# Patient Record
Sex: MALE | Race: BLACK OR AFRICAN AMERICAN | NOT HISPANIC OR LATINO | URBAN - METROPOLITAN AREA
[De-identification: names, ages, dates, MRNs, and addresses within clinical notes are randomized per-mention and may not be internally consistent; named-entity substitution may affect disease eponyms.]

---

## 2017-03-17 ENCOUNTER — OUTPATIENT (OUTPATIENT)
Dept: OUTPATIENT SERVICES | Facility: HOSPITAL | Age: 47
LOS: 1 days | End: 2017-03-17

## 2017-03-17 ENCOUNTER — APPOINTMENT (OUTPATIENT)
Dept: OTOLARYNGOLOGY | Facility: CLINIC | Age: 47
End: 2017-03-17

## 2017-03-17 DIAGNOSIS — R22.0 LOCALIZED SWELLING, MASS AND LUMP, HEAD: ICD-10-CM

## 2017-03-17 DIAGNOSIS — R22.1 LOCALIZED SWELLING, MASS AND LUMP, HEAD: ICD-10-CM

## 2017-03-17 PROBLEM — Z00.00 ENCOUNTER FOR PREVENTIVE HEALTH EXAMINATION: Status: ACTIVE | Noted: 2017-03-17

## 2017-03-20 ENCOUNTER — OTHER (OUTPATIENT)
Age: 47
End: 2017-03-20

## 2017-03-27 ENCOUNTER — FORM ENCOUNTER (OUTPATIENT)
Age: 47
End: 2017-03-27

## 2017-03-28 ENCOUNTER — OUTPATIENT (OUTPATIENT)
Dept: OUTPATIENT SERVICES | Facility: HOSPITAL | Age: 47
LOS: 1 days | End: 2017-03-28
Payer: COMMERCIAL

## 2017-03-28 PROCEDURE — 70543 MRI ORBT/FAC/NCK W/O &W/DYE: CPT | Mod: 26

## 2017-03-28 PROCEDURE — 70543 MRI ORBT/FAC/NCK W/O &W/DYE: CPT

## 2017-03-28 PROCEDURE — A9585: CPT

## 2017-03-29 ENCOUNTER — RESULT REVIEW (OUTPATIENT)
Age: 47
End: 2017-03-29

## 2017-03-30 ENCOUNTER — OUTPATIENT (OUTPATIENT)
Dept: OUTPATIENT SERVICES | Facility: HOSPITAL | Age: 47
LOS: 1 days | End: 2017-03-30
Payer: COMMERCIAL

## 2017-03-30 DIAGNOSIS — R22.0 LOCALIZED SWELLING, MASS AND LUMP, HEAD: ICD-10-CM

## 2017-03-30 PROCEDURE — 88321 CONSLTJ&REPRT SLD PREP ELSWR: CPT

## 2017-03-31 LAB — NON-GYNECOLOGICAL CYTOLOGY STUDY: SIGNIFICANT CHANGE UP

## 2017-04-03 ENCOUNTER — RESULT REVIEW (OUTPATIENT)
Age: 47
End: 2017-04-03

## 2017-04-03 VITALS
SYSTOLIC BLOOD PRESSURE: 116 MMHG | RESPIRATION RATE: 16 BRPM | DIASTOLIC BLOOD PRESSURE: 77 MMHG | HEART RATE: 56 BPM | WEIGHT: 173.06 LBS | OXYGEN SATURATION: 98 % | HEIGHT: 69 IN | TEMPERATURE: 98 F

## 2017-04-03 NOTE — ASU PATIENT PROFILE, ADULT - PSH
No significant past surgical history Surgery, elective  Fatty mass removal from upper right shoulder

## 2017-04-04 ENCOUNTER — OUTPATIENT (OUTPATIENT)
Dept: OUTPATIENT SERVICES | Facility: HOSPITAL | Age: 47
LOS: 1 days | Discharge: HOME | End: 2017-04-04
Payer: COMMERCIAL

## 2017-04-04 ENCOUNTER — APPOINTMENT (OUTPATIENT)
Dept: OTOLARYNGOLOGY | Facility: HOSPITAL | Age: 47
End: 2017-04-04

## 2017-04-04 VITALS
OXYGEN SATURATION: 100 % | SYSTOLIC BLOOD PRESSURE: 119 MMHG | DIASTOLIC BLOOD PRESSURE: 77 MMHG | RESPIRATION RATE: 16 BRPM | HEART RATE: 84 BPM

## 2017-04-04 DIAGNOSIS — Z41.9 ENCOUNTER FOR PROCEDURE FOR PURPOSES OTHER THAN REMEDYING HEALTH STATE, UNSPECIFIED: Chronic | ICD-10-CM

## 2017-04-04 PROCEDURE — 88341 IMHCHEM/IMCYTCHM EA ADD ANTB: CPT

## 2017-04-04 PROCEDURE — 88360 TUMOR IMMUNOHISTOCHEM/MANUAL: CPT

## 2017-04-04 PROCEDURE — 95867 NDL EMG CRANIAL NRV MUSC UNI: CPT | Mod: 26

## 2017-04-04 PROCEDURE — 21555 EXC NECK LES SC < 3 CM: CPT

## 2017-04-04 PROCEDURE — 42440 EXCISE SUBMAXILLARY GLAND: CPT | Mod: LT

## 2017-04-04 PROCEDURE — 88307 TISSUE EXAM BY PATHOLOGIST: CPT

## 2017-04-04 PROCEDURE — 88305 TISSUE EXAM BY PATHOLOGIST: CPT

## 2017-04-04 RX ORDER — CEPHALEXIN 500 MG
1 CAPSULE ORAL
Qty: 10 | Refills: 0 | OUTPATIENT
Start: 2017-04-04 | End: 2017-04-09

## 2017-04-04 RX ORDER — ACETAMINOPHEN WITH CODEINE 300MG-30MG
1 TABLET ORAL
Qty: 20 | Refills: 0 | OUTPATIENT
Start: 2017-04-04

## 2017-04-04 RX ORDER — MORPHINE SULFATE 50 MG/1
4 CAPSULE, EXTENDED RELEASE ORAL
Qty: 0 | Refills: 0 | Status: DISCONTINUED | OUTPATIENT
Start: 2017-04-04 | End: 2017-04-04

## 2017-04-04 RX ORDER — ONDANSETRON 8 MG/1
4 TABLET, FILM COATED ORAL EVERY 6 HOURS
Qty: 0 | Refills: 0 | Status: DISCONTINUED | OUTPATIENT
Start: 2017-04-04 | End: 2017-04-04

## 2017-04-04 RX ORDER — ACETAMINOPHEN 500 MG
650 TABLET ORAL EVERY 6 HOURS
Qty: 0 | Refills: 0 | Status: DISCONTINUED | OUTPATIENT
Start: 2017-04-04 | End: 2017-04-04

## 2017-04-04 RX ORDER — SODIUM CHLORIDE 9 MG/ML
1000 INJECTION, SOLUTION INTRAVENOUS
Qty: 0 | Refills: 0 | Status: DISCONTINUED | OUTPATIENT
Start: 2017-04-04 | End: 2017-04-04

## 2017-04-04 NOTE — BRIEF OPERATIVE NOTE - OPERATION/FINDINGS
tumor extending and infiltrating onto left marginal mandibular nerve. tumor left on nerve to preserve it.

## 2017-04-06 PROBLEM — I10 ESSENTIAL (PRIMARY) HYPERTENSION: Chronic | Status: ACTIVE | Noted: 2017-04-03

## 2017-04-06 LAB — SURGICAL PATHOLOGY STUDY: SIGNIFICANT CHANGE UP

## 2017-04-07 DIAGNOSIS — C76.0 MALIGNANT NEOPLASM OF HEAD, FACE AND NECK: ICD-10-CM

## 2017-04-10 ENCOUNTER — APPOINTMENT (OUTPATIENT)
Dept: OTOLARYNGOLOGY | Facility: CLINIC | Age: 47
End: 2017-04-10

## 2017-04-10 VITALS — TEMPERATURE: 98.2 F | SYSTOLIC BLOOD PRESSURE: 149 MMHG | HEART RATE: 58 BPM | DIASTOLIC BLOOD PRESSURE: 81 MMHG

## 2017-04-17 ENCOUNTER — APPOINTMENT (OUTPATIENT)
Dept: OTOLARYNGOLOGY | Facility: CLINIC | Age: 47
End: 2017-04-17

## 2017-04-24 ENCOUNTER — APPOINTMENT (OUTPATIENT)
Dept: OTOLARYNGOLOGY | Facility: CLINIC | Age: 47
End: 2017-04-24

## 2017-04-24 VITALS
SYSTOLIC BLOOD PRESSURE: 131 MMHG | TEMPERATURE: 97.7 F | HEIGHT: 69 IN | DIASTOLIC BLOOD PRESSURE: 81 MMHG | BODY MASS INDEX: 25.62 KG/M2 | HEART RATE: 56 BPM | WEIGHT: 173 LBS

## 2017-05-31 VITALS
DIASTOLIC BLOOD PRESSURE: 68 MMHG | HEIGHT: 69 IN | HEART RATE: 56 BPM | SYSTOLIC BLOOD PRESSURE: 142 MMHG | RESPIRATION RATE: 16 BRPM | TEMPERATURE: 98 F | WEIGHT: 167.33 LBS | OXYGEN SATURATION: 100 %

## 2017-05-31 NOTE — ASU PATIENT PROFILE, ADULT - PSH
Surgery, elective  Fatty mass removal from upper right shoulder Elective surgery  submandibular mass removed, 4/2017  Surgery, elective  Fatty mass removal from upper right shoulder

## 2017-06-01 ENCOUNTER — RESULT REVIEW (OUTPATIENT)
Age: 47
End: 2017-06-01

## 2017-06-01 ENCOUNTER — OUTPATIENT (OUTPATIENT)
Dept: OUTPATIENT SERVICES | Facility: HOSPITAL | Age: 47
LOS: 1 days | Discharge: HOME | End: 2017-06-01
Payer: COMMERCIAL

## 2017-06-01 ENCOUNTER — APPOINTMENT (OUTPATIENT)
Dept: OTOLARYNGOLOGY | Facility: HOSPITAL | Age: 47
End: 2017-06-01

## 2017-06-01 DIAGNOSIS — R59.9 ENLARGED LYMPH NODES, UNSPECIFIED: ICD-10-CM

## 2017-06-01 DIAGNOSIS — Z41.9 ENCOUNTER FOR PROCEDURE FOR PURPOSES OTHER THAN REMEDYING HEALTH STATE, UNSPECIFIED: Chronic | ICD-10-CM

## 2017-06-01 DIAGNOSIS — I10 ESSENTIAL (PRIMARY) HYPERTENSION: ICD-10-CM

## 2017-06-01 DIAGNOSIS — D49.89 NEOPLASM OF UNSPECIFIED BEHAVIOR OF OTHER SPECIFIED SITES: ICD-10-CM

## 2017-06-01 PROCEDURE — 38724 REMOVAL OF LYMPH NODES NECK: CPT | Mod: 58,LT

## 2017-06-01 RX ORDER — CEFAZOLIN SODIUM 1 G
1000 VIAL (EA) INJECTION EVERY 8 HOURS
Qty: 0 | Refills: 0 | Status: DISCONTINUED | OUTPATIENT
Start: 2017-06-01 | End: 2017-06-02

## 2017-06-01 RX ORDER — ONDANSETRON 8 MG/1
4 TABLET, FILM COATED ORAL EVERY 6 HOURS
Qty: 0 | Refills: 0 | Status: DISCONTINUED | OUTPATIENT
Start: 2017-06-01 | End: 2017-06-02

## 2017-06-01 RX ORDER — SODIUM CHLORIDE 9 MG/ML
1000 INJECTION, SOLUTION INTRAVENOUS
Qty: 0 | Refills: 0 | Status: DISCONTINUED | OUTPATIENT
Start: 2017-06-01 | End: 2017-06-02

## 2017-06-01 RX ORDER — DIPHENHYDRAMINE HCL 50 MG
25 CAPSULE ORAL AT BEDTIME
Qty: 0 | Refills: 0 | Status: DISCONTINUED | OUTPATIENT
Start: 2017-06-01 | End: 2017-06-02

## 2017-06-01 RX ORDER — ACETAMINOPHEN 500 MG
650 TABLET ORAL EVERY 6 HOURS
Qty: 0 | Refills: 0 | Status: DISCONTINUED | OUTPATIENT
Start: 2017-06-01 | End: 2017-06-02

## 2017-06-01 RX ORDER — MORPHINE SULFATE 50 MG/1
4 CAPSULE, EXTENDED RELEASE ORAL
Qty: 0 | Refills: 0 | Status: DISCONTINUED | OUTPATIENT
Start: 2017-06-01 | End: 2017-06-01

## 2017-06-01 NOTE — H&P ADULT - HISTORY OF PRESENT ILLNESS
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 It is a rare tumor with little literature on the topic. Patient returning for bilateral level 1 neck dissection and left levels 2-5 neck dissection. No other symptoms other than neck mass

## 2017-06-01 NOTE — PROGRESS NOTE ADULT - ASSESSMENT
Assessment/Plan:  46y Male s/p neck dissection  - pain control  - anti-emetics prn  - regular diet  - ambulate  - discussed case with dr Auguste who also examined the patient and agrees with the plan    PPX: Chavez, I/S    Dispo planning: TBD

## 2017-06-01 NOTE — PROGRESS NOTE ADULT - SUBJECTIVE AND OBJECTIVE BOX
ENT [facility] POST OP CHECK    Procedure: neck dissection    No acute events post op. Pain controlled. Tolerating PO. Ambulating. Denies nausea/vomiting.       MEDICATIONS:  Antiinfectives:   ceFAZolin   IVPB 1000milliGRAM(s) IV Intermittent every 8 hours    IV fluids:  lactated ringers. 1000milliLiter(s) IV Continuous <Continuous>      Pain medications/Neuro:  acetaminophen   Tablet. 650milliGRAM(s) Oral every 6 hours PRN  oxyCODONE  5 mG/acetaminophen 325 mG 1Tablet(s) Oral every 4 hours PRN  oxyCODONE  5 mG/acetaminophen 325 mG 2Tablet(s) Oral every 6 hours PRN  morphine  - Injectable 4milliGRAM(s) IV Push every 15 minutes PRN  ondansetron Injectable 4milliGRAM(s) IV Push every 6 hours PRN  diphenhydrAMINE   Capsule 25milliGRAM(s) Oral at bedtime PRN      Vital Signs Last 24 Hrs  T(C): 36.1, Max: 36.1 (06-01 @ 19:56)  T(F): 96.9, Max: 96.9 (06-01 @ 19:56)  HR: 61 (61 - 72)  BP: 155/86 (131/82 - 155/86)  RR: 9 (7 - 17)  SpO2: 100% (98% - 100%)      PHYSICAL EXAM:    ENT EXAM-   Constitutional: Well-developed, well-nourished.    Head:  normocephalic, atraumatic.   Neck:  Neck soft, flat, incision c/d/i  Lymph:  No cervical adenopathy.    MULTISYSTEM EXAM-  Neuro/Psych:  A&O x 3.  Mood stable.  Affect appropriate  Cranial nerves: 2-12 grossly intact bilaterally.  Eyes:  EOMI, PERRL no nystagmus.  Pulm:  No dyspnea, non-labored breathing on room air  Cardiovascular: Carotid pulses 2+ bilaterally.  No periphreal edema.  Skin:  No rash or lesions on exposed skin of head/neck

## 2017-06-01 NOTE — H&P ADULT - PSH
Elective surgery  submandibular mass removed, 4/2017  Surgery, elective  Fatty mass removal from upper right shoulder

## 2017-06-01 NOTE — H&P ADULT - NSHPPHYSICALEXAM_GEN_ALL_CORE
NAD. comfortable  no increased WOB. no stridor  neck soft, flat. no erythema or ecchymosis.   FARHAN drain in place with serosanguinous fluid

## 2017-06-02 VITALS
DIASTOLIC BLOOD PRESSURE: 78 MMHG | HEART RATE: 61 BPM | SYSTOLIC BLOOD PRESSURE: 141 MMHG | TEMPERATURE: 98 F | RESPIRATION RATE: 17 BRPM | OXYGEN SATURATION: 100 %

## 2017-06-02 PROCEDURE — 88307 TISSUE EXAM BY PATHOLOGIST: CPT

## 2017-06-02 PROCEDURE — C1889: CPT

## 2017-06-02 PROCEDURE — 86900 BLOOD TYPING SEROLOGIC ABO: CPT

## 2017-06-02 PROCEDURE — 38720 REMOVAL OF LYMPH NODES NECK: CPT

## 2017-06-02 PROCEDURE — 86901 BLOOD TYPING SEROLOGIC RH(D): CPT

## 2017-06-02 PROCEDURE — 88341 IMHCHEM/IMCYTCHM EA ADD ANTB: CPT

## 2017-06-02 PROCEDURE — 88360 TUMOR IMMUNOHISTOCHEM/MANUAL: CPT

## 2017-06-02 PROCEDURE — 86850 RBC ANTIBODY SCREEN: CPT

## 2017-06-02 RX ORDER — AMLODIPINE BESYLATE 2.5 MG/1
5 TABLET ORAL DAILY
Qty: 0 | Refills: 0 | Status: DISCONTINUED | OUTPATIENT
Start: 2017-06-02 | End: 2017-06-02

## 2017-06-02 RX ORDER — AMOXICILLIN 250 MG/5ML
1 SUSPENSION, RECONSTITUTED, ORAL (ML) ORAL
Qty: 10 | Refills: 0 | OUTPATIENT
Start: 2017-06-02 | End: 2017-06-07

## 2017-06-02 RX ORDER — ACETAMINOPHEN WITH CODEINE 300MG-30MG
1 TABLET ORAL
Qty: 16 | Refills: 0 | OUTPATIENT
Start: 2017-06-02

## 2017-06-02 RX ORDER — LOSARTAN POTASSIUM 100 MG/1
50 TABLET, FILM COATED ORAL DAILY
Qty: 0 | Refills: 0 | Status: DISCONTINUED | OUTPATIENT
Start: 2017-06-02 | End: 2017-06-02

## 2017-06-02 RX ADMIN — AMLODIPINE BESYLATE 5 MILLIGRAM(S): 2.5 TABLET ORAL at 10:13

## 2017-06-02 RX ADMIN — LOSARTAN POTASSIUM 50 MILLIGRAM(S): 100 TABLET, FILM COATED ORAL at 10:13

## 2017-06-02 RX ADMIN — Medication 100 MILLIGRAM(S): at 11:39

## 2017-06-02 RX ADMIN — Medication 100 MILLIGRAM(S): at 03:05

## 2017-06-02 NOTE — PROGRESS NOTE ADULT - SUBJECTIVE AND OBJECTIVE BOX
ENT Progress Note    Interim: no events. pain controlled. tolerating PO. denies n/v/f/c/CP/SOB    PE:  AFVSS  NAD. comfortable. no increased WOB.   no stridor  face symmetric.   neck soft, flat. no LAD.   incision c/d/i.     Assessment/Plan:  46y Male s/p level 1A neck dissection  - pain control  - anti-emetics prn  - regular diet  - ambulate  - likely home this AM   - discussed case with dr Auguste who also examined the patient and agrees with the plan    PPX: SCDs, I/S    Dispo planning: TBD

## 2017-06-08 LAB — SURGICAL PATHOLOGY STUDY: SIGNIFICANT CHANGE UP

## 2017-06-14 PROBLEM — K11.8 OTHER DISEASES OF SALIVARY GLANDS: Chronic | Status: ACTIVE | Noted: 2017-05-31

## 2017-06-19 ENCOUNTER — APPOINTMENT (OUTPATIENT)
Dept: OTOLARYNGOLOGY | Facility: CLINIC | Age: 47
End: 2017-06-19

## 2017-06-19 VITALS
SYSTOLIC BLOOD PRESSURE: 153 MMHG | BODY MASS INDEX: 25.62 KG/M2 | HEIGHT: 69 IN | DIASTOLIC BLOOD PRESSURE: 93 MMHG | OXYGEN SATURATION: 99 % | WEIGHT: 173 LBS | TEMPERATURE: 97.7 F | HEART RATE: 53 BPM

## 2017-06-19 DIAGNOSIS — C49.9 MALIGNANT NEOPLASM OF CONNECTIVE AND SOFT TISSUE, UNSPECIFIED: ICD-10-CM

## 2018-03-16 DIAGNOSIS — R22.1 LOCALIZED SWELLING, MASS AND LUMP, NECK: ICD-10-CM

## 2018-04-01 ENCOUNTER — FORM ENCOUNTER (OUTPATIENT)
Age: 48
End: 2018-04-01

## 2018-04-02 ENCOUNTER — OUTPATIENT (OUTPATIENT)
Dept: OUTPATIENT SERVICES | Facility: HOSPITAL | Age: 48
LOS: 1 days | End: 2018-04-02
Payer: COMMERCIAL

## 2018-04-02 ENCOUNTER — APPOINTMENT (OUTPATIENT)
Dept: MRI IMAGING | Facility: HOSPITAL | Age: 48
End: 2018-04-02
Payer: COMMERCIAL

## 2018-04-02 DIAGNOSIS — Z41.9 ENCOUNTER FOR PROCEDURE FOR PURPOSES OTHER THAN REMEDYING HEALTH STATE, UNSPECIFIED: Chronic | ICD-10-CM

## 2018-04-02 PROCEDURE — 70543 MRI ORBT/FAC/NCK W/O &W/DYE: CPT

## 2018-04-02 PROCEDURE — 70543 MRI ORBT/FAC/NCK W/O &W/DYE: CPT | Mod: 26

## 2018-04-02 PROCEDURE — A9585: CPT

## 2019-02-27 ENCOUNTER — EMERGENCY (EMERGENCY)
Facility: HOSPITAL | Age: 49
LOS: 1 days | End: 2019-02-27
Attending: EMERGENCY MEDICINE
Payer: COMMERCIAL

## 2019-02-27 VITALS
RESPIRATION RATE: 18 BRPM | HEART RATE: 60 BPM | TEMPERATURE: 98 F | DIASTOLIC BLOOD PRESSURE: 93 MMHG | HEIGHT: 69 IN | OXYGEN SATURATION: 100 % | SYSTOLIC BLOOD PRESSURE: 207 MMHG | WEIGHT: 173.06 LBS

## 2019-02-27 DIAGNOSIS — Z41.9 ENCOUNTER FOR PROCEDURE FOR PURPOSES OTHER THAN REMEDYING HEALTH STATE, UNSPECIFIED: Chronic | ICD-10-CM

## 2019-02-27 LAB
ALBUMIN SERPL ELPH-MCNC: 4.9 G/DL — SIGNIFICANT CHANGE UP (ref 3.3–5)
ALP SERPL-CCNC: 60 U/L — SIGNIFICANT CHANGE UP (ref 40–120)
ALT FLD-CCNC: 28 U/L — SIGNIFICANT CHANGE UP (ref 10–45)
ANION GAP SERPL CALC-SCNC: 17 MMOL/L — SIGNIFICANT CHANGE UP (ref 5–17)
APPEARANCE UR: CLEAR — SIGNIFICANT CHANGE UP
AST SERPL-CCNC: 30 U/L — SIGNIFICANT CHANGE UP (ref 10–40)
BASOPHILS # BLD AUTO: 0 K/UL — SIGNIFICANT CHANGE UP (ref 0–0.2)
BASOPHILS NFR BLD AUTO: 0.2 % — SIGNIFICANT CHANGE UP (ref 0–2)
BILIRUB SERPL-MCNC: 0.3 MG/DL — SIGNIFICANT CHANGE UP (ref 0.2–1.2)
BILIRUB UR-MCNC: NEGATIVE — SIGNIFICANT CHANGE UP
BUN SERPL-MCNC: 18 MG/DL — SIGNIFICANT CHANGE UP (ref 7–23)
CALCIUM SERPL-MCNC: 10.2 MG/DL — SIGNIFICANT CHANGE UP (ref 8.4–10.5)
CHLORIDE SERPL-SCNC: 101 MMOL/L — SIGNIFICANT CHANGE UP (ref 96–108)
CO2 SERPL-SCNC: 23 MMOL/L — SIGNIFICANT CHANGE UP (ref 22–31)
COLOR SPEC: COLORLESS — SIGNIFICANT CHANGE UP
CREAT SERPL-MCNC: 1.41 MG/DL — HIGH (ref 0.5–1.3)
DIFF PNL FLD: NEGATIVE — SIGNIFICANT CHANGE UP
EOSINOPHIL # BLD AUTO: 0 K/UL — SIGNIFICANT CHANGE UP (ref 0–0.5)
EOSINOPHIL NFR BLD AUTO: 0.4 % — SIGNIFICANT CHANGE UP (ref 0–6)
GAS PNL BLDV: SIGNIFICANT CHANGE UP
GAS PNL BLDV: SIGNIFICANT CHANGE UP
GLUCOSE SERPL-MCNC: 133 MG/DL — HIGH (ref 70–99)
GLUCOSE UR QL: NEGATIVE — SIGNIFICANT CHANGE UP
HCT VFR BLD CALC: 38.6 % — LOW (ref 39–50)
HGB BLD-MCNC: 12.9 G/DL — LOW (ref 13–17)
KETONES UR-MCNC: NEGATIVE — SIGNIFICANT CHANGE UP
LEUKOCYTE ESTERASE UR-ACNC: NEGATIVE — SIGNIFICANT CHANGE UP
LIDOCAIN IGE QN: 24 U/L — SIGNIFICANT CHANGE UP (ref 7–60)
LYMPHOCYTES # BLD AUTO: 1.3 K/UL — SIGNIFICANT CHANGE UP (ref 1–3.3)
LYMPHOCYTES # BLD AUTO: 14.7 % — SIGNIFICANT CHANGE UP (ref 13–44)
MCHC RBC-ENTMCNC: 31.5 PG — SIGNIFICANT CHANGE UP (ref 27–34)
MCHC RBC-ENTMCNC: 33.4 GM/DL — SIGNIFICANT CHANGE UP (ref 32–36)
MCV RBC AUTO: 94.1 FL — SIGNIFICANT CHANGE UP (ref 80–100)
MONOCYTES # BLD AUTO: 0.4 K/UL — SIGNIFICANT CHANGE UP (ref 0–0.9)
MONOCYTES NFR BLD AUTO: 4.9 % — SIGNIFICANT CHANGE UP (ref 2–14)
NEUTROPHILS # BLD AUTO: 7.3 K/UL — SIGNIFICANT CHANGE UP (ref 1.8–7.4)
NEUTROPHILS NFR BLD AUTO: 79.8 % — HIGH (ref 43–77)
NITRITE UR-MCNC: NEGATIVE — SIGNIFICANT CHANGE UP
PH UR: 8 — SIGNIFICANT CHANGE UP (ref 5–8)
PLATELET # BLD AUTO: 245 K/UL — SIGNIFICANT CHANGE UP (ref 150–400)
POTASSIUM SERPL-MCNC: 4.1 MMOL/L — SIGNIFICANT CHANGE UP (ref 3.5–5.3)
POTASSIUM SERPL-SCNC: 4.1 MMOL/L — SIGNIFICANT CHANGE UP (ref 3.5–5.3)
PROT SERPL-MCNC: 8.3 G/DL — SIGNIFICANT CHANGE UP (ref 6–8.3)
PROT UR-MCNC: NEGATIVE — SIGNIFICANT CHANGE UP
RBC # BLD: 4.1 M/UL — LOW (ref 4.2–5.8)
RBC # FLD: 13.7 % — SIGNIFICANT CHANGE UP (ref 10.3–14.5)
SODIUM SERPL-SCNC: 141 MMOL/L — SIGNIFICANT CHANGE UP (ref 135–145)
SP GR SPEC: 1.01 — SIGNIFICANT CHANGE UP (ref 1.01–1.02)
UROBILINOGEN FLD QL: NEGATIVE — SIGNIFICANT CHANGE UP
WBC # BLD: 9.1 K/UL — SIGNIFICANT CHANGE UP (ref 3.8–10.5)
WBC # FLD AUTO: 9.1 K/UL — SIGNIFICANT CHANGE UP (ref 3.8–10.5)

## 2019-02-27 PROCEDURE — 74177 CT ABD & PELVIS W/CONTRAST: CPT | Mod: 26

## 2019-02-27 PROCEDURE — 99218: CPT

## 2019-02-27 PROCEDURE — 76705 ECHO EXAM OF ABDOMEN: CPT | Mod: 26

## 2019-02-27 RX ORDER — ONDANSETRON 8 MG/1
4 TABLET, FILM COATED ORAL EVERY 6 HOURS
Qty: 0 | Refills: 0 | Status: DISCONTINUED | OUTPATIENT
Start: 2019-02-27 | End: 2019-03-03

## 2019-02-27 RX ORDER — FAMOTIDINE 10 MG/ML
20 INJECTION INTRAVENOUS ONCE
Qty: 0 | Refills: 0 | Status: DISCONTINUED | OUTPATIENT
Start: 2019-02-27 | End: 2019-02-27

## 2019-02-27 RX ORDER — ACETAMINOPHEN 500 MG
1000 TABLET ORAL ONCE
Qty: 0 | Refills: 0 | Status: COMPLETED | OUTPATIENT
Start: 2019-02-27 | End: 2019-02-27

## 2019-02-27 RX ORDER — SODIUM CHLORIDE 9 MG/ML
1000 INJECTION INTRAMUSCULAR; INTRAVENOUS; SUBCUTANEOUS ONCE
Qty: 0 | Refills: 0 | Status: COMPLETED | OUTPATIENT
Start: 2019-02-27 | End: 2019-02-27

## 2019-02-27 RX ORDER — TAMSULOSIN HYDROCHLORIDE 0.4 MG/1
0.4 CAPSULE ORAL DAILY
Qty: 0 | Refills: 0 | Status: DISCONTINUED | OUTPATIENT
Start: 2019-02-27 | End: 2019-03-03

## 2019-02-27 RX ORDER — SODIUM CHLORIDE 9 MG/ML
1000 INJECTION, SOLUTION INTRAVENOUS
Qty: 0 | Refills: 0 | Status: DISCONTINUED | OUTPATIENT
Start: 2019-02-27 | End: 2019-03-03

## 2019-02-27 RX ORDER — OXYCODONE HYDROCHLORIDE 5 MG/1
5 TABLET ORAL EVERY 6 HOURS
Qty: 0 | Refills: 0 | Status: DISCONTINUED | OUTPATIENT
Start: 2019-02-27 | End: 2019-02-28

## 2019-02-27 RX ORDER — MORPHINE SULFATE 50 MG/1
4 CAPSULE, EXTENDED RELEASE ORAL ONCE
Qty: 0 | Refills: 0 | Status: DISCONTINUED | OUTPATIENT
Start: 2019-02-27 | End: 2019-02-27

## 2019-02-27 RX ORDER — KETOROLAC TROMETHAMINE 30 MG/ML
30 SYRINGE (ML) INJECTION ONCE
Qty: 0 | Refills: 0 | Status: DISCONTINUED | OUTPATIENT
Start: 2019-02-27 | End: 2019-02-27

## 2019-02-27 RX ORDER — FAMOTIDINE 10 MG/ML
20 INJECTION INTRAVENOUS ONCE
Qty: 0 | Refills: 0 | Status: COMPLETED | OUTPATIENT
Start: 2019-02-27 | End: 2019-02-27

## 2019-02-27 RX ORDER — ONDANSETRON 8 MG/1
4 TABLET, FILM COATED ORAL ONCE
Qty: 0 | Refills: 0 | Status: COMPLETED | OUTPATIENT
Start: 2019-02-27 | End: 2019-02-27

## 2019-02-27 RX ADMIN — SODIUM CHLORIDE 125 MILLILITER(S): 9 INJECTION, SOLUTION INTRAVENOUS at 14:04

## 2019-02-27 RX ADMIN — MORPHINE SULFATE 4 MILLIGRAM(S): 50 CAPSULE, EXTENDED RELEASE ORAL at 12:57

## 2019-02-27 RX ADMIN — Medication 30 MILLILITER(S): at 09:00

## 2019-02-27 RX ADMIN — SODIUM CHLORIDE 2000 MILLILITER(S): 9 INJECTION INTRAMUSCULAR; INTRAVENOUS; SUBCUTANEOUS at 08:29

## 2019-02-27 RX ADMIN — Medication 30 MILLIGRAM(S): at 11:44

## 2019-02-27 RX ADMIN — OXYCODONE HYDROCHLORIDE 5 MILLIGRAM(S): 5 TABLET ORAL at 14:25

## 2019-02-27 RX ADMIN — SODIUM CHLORIDE 1000 MILLILITER(S): 9 INJECTION INTRAMUSCULAR; INTRAVENOUS; SUBCUTANEOUS at 12:07

## 2019-02-27 RX ADMIN — Medication 30 MILLIGRAM(S): at 12:07

## 2019-02-27 RX ADMIN — ONDANSETRON 4 MILLIGRAM(S): 8 TABLET, FILM COATED ORAL at 08:10

## 2019-02-27 RX ADMIN — TAMSULOSIN HYDROCHLORIDE 0.4 MILLIGRAM(S): 0.4 CAPSULE ORAL at 14:05

## 2019-02-27 RX ADMIN — Medication 400 MILLIGRAM(S): at 08:28

## 2019-02-27 RX ADMIN — Medication 1000 MILLIGRAM(S): at 09:00

## 2019-02-27 RX ADMIN — OXYCODONE HYDROCHLORIDE 5 MILLIGRAM(S): 5 TABLET ORAL at 14:55

## 2019-02-27 RX ADMIN — Medication 1000 MILLIGRAM(S): at 09:49

## 2019-02-27 RX ADMIN — FAMOTIDINE 20 MILLIGRAM(S): 10 INJECTION INTRAVENOUS at 08:28

## 2019-02-27 NOTE — ED PROVIDER NOTE - OBJECTIVE STATEMENT
47 yo M h/o HTN who reports 2 days of nausea, vomiting, and intermittent abdominal discomfort. Patient reports developing a few episodes of non bloody emesis starting two nights ago which resolved on its own. He had mild abdominal discomfort which was relieved after vomiting. He otherwise felt well up until 24 hrs later (last night) he again developed similar symptoms but w/ out resolvent.  He was seen by a gastroenterologist on Monday (after his first episode) where he was scheduled for an EGD and CT at a later date. He was told that he may have an ulcer. 49 yo M h/o HTN who reports 2 days of nausea, vomiting, and intermittent abdominal discomfort. Patient reports developing a few episodes of non bloody emesis starting two nights ago which resolved on its own. He had mild abdominal discomfort which was relieved after vomiting. He otherwise felt well up until 24 hrs later (last night) he again developed similar symptoms but w/ out resolvent.  He was seen by a gastroenterologist on Monday (after his first episode) where he was scheduled for an EGD and CT at a later date. He was told that he may have an ulcer. Patient denies NSAID use, steroid use, history of GERD/gastritis/ulcer or any abdominal pathology. Pt. has never received an EGD. Denies sick contacts or recent travel. Further, he denies CP, SOB, pleuritic pain, urinary complaints, rashes, headaches, myalgias.

## 2019-02-27 NOTE — ED CDU PROVIDER INITIAL DAY NOTE - OBJECTIVE STATEMENT
49 yo M h/o HTN, renal stones (passed on his own) c/o 2 days of nausea, vomiting, abdominal pain. pt reports that started with pain L sided a few days ago and had f/up with GI specialist who planned for EGD and CT scan outpt. pt states at that time, yesterday, felt well at office and had no pain. but then at 4am started to have severe L sided abdominal pain that was constant since. +N +V- non-bloody. pain does radiate to L side lower abdomen.  denies fever, hematuria, dysuria, urinary hesitancy, diarrhea

## 2019-02-27 NOTE — ED PROVIDER NOTE - CONSTITUTIONAL, MLM
normal... , awake, alert, oriented to person, place, time/situation and in moderate GI distress distress and mild painful distress

## 2019-02-27 NOTE — ED PROVIDER NOTE - PROGRESS NOTE DETAILS
Dr. Nair, Fellow - Patient HD stable, sleeping in bed. re-eval - abdomen w/ mild diffuse tenderness, non peritoneal, non focal. +relief w/ zofran. No new complaints. Bedside U/S w/ no evidence of obvious RUQ pathology. Pending CT.

## 2019-02-27 NOTE — ED CDU PROVIDER INITIAL DAY NOTE - ENMT NEGATIVE STATEMENT, MLM
Ears: no ear pain and no hearing problems.Nose: no nasal congestion and no nasal drainage.Mouth/Throat: no dysphagia, no hoarseness and no throat pain.Neck: no lumps, no pain, no stiffness and no swollen glands home

## 2019-02-27 NOTE — ED PROVIDER NOTE - ATTENDING CONTRIBUTION TO CARE
49 y/o m with pmhx HTN, presents for vomiting and abd pain. patient describes the pain as upper abd non radiating. no fever or chills. no back pain. began approx 2 days ago. also had 3 episodes of non bloody emesis. feels better after vomiting.  had improved and started getting worse last night.  He was seen by a gastroenterologist on Monday (after his first episode) where he was scheduled for an EGD and CT at a later date. 47 y/o m with pmhx HTN, presents for vomiting and abd pain. patient describes the pain as upper abd non radiating. no fever or chills. no back pain. began approx 2 days ago. also had 3 episodes of non bloody emesis. feels better after vomiting.  had improved and started getting worse last night.  He was seen by a gastroenterologist on Monday.  Gen.  no acute distress  HEENT:  perrl eomi  Lungs:  b/l bs  CVS: S1S2   Abd;  soft, +ttp llq, no guarding. no distention. no rlq tend.   Ext: no edema  Neuro: aaox 3 no focal deficits  MSK: strength 5/5 b/l upper and lower ext.

## 2019-02-27 NOTE — ED CDU PROVIDER INITIAL DAY NOTE - MEDICAL DECISION MAKING DETAILS
ATTG: : 3 mm stone prox ureter, still with pain, transferred to CDU for pain control, ivf, and re eval for dispo.

## 2019-02-27 NOTE — ED CDU PROVIDER INITIAL DAY NOTE - PROGRESS NOTE DETAILS
CDU NOTE JOHAN Chapa: pt resting comfortably, feels well without complaint. pain controlled after oxycodone. NA recent VSS. CDU PROGRESS NOTE JOHAN QUINN: Received pt at 1900 sign-out. Pt resting in stretcher in NAD. Case/plan reviewed. VSS. Pt ate dinner. Ambulatory around unit with steady gait. S1 S2 noted, RRR, lungs CTA b/l, BS x4 with soft, nontender abdomen. Pt without complaints. Will continue to monitor overnight. CDU PROGRESS NOTE PA CHEYENNE: Pt resting in stretcher in NAD. VSS. BS x4 with soft, nontender abdomen. Pt without complaints. Will continue to monitor overnight.

## 2019-02-27 NOTE — ED ADULT NURSE NOTE - OBJECTIVE STATEMENT
48 y m came to the ed c/o abdominal pain and severe vomiting. patient was supposed to go for an endoscopy today due to abdominal pain but was unable to make it to the appointment due to symptoms. patient states waking up at 430am this morning vomiting and in pain. patient states pain is at the umbilicus. c/o severe constipation only having one or two small bowel movements over the last few days. denies any diarrhea. denies any chest pain, sob. skin is wet but warm. denies pain/burning on urination.

## 2019-02-27 NOTE — ED CDU PROVIDER INITIAL DAY NOTE - ATTENDING CONTRIBUTION TO CARE
47 y/o m with pmhx HTN, presents for vomiting and abd pain. patient describes the pain as upper abd non radiating. no fever or chills. no back pain. began approx 2 days ago. also had 3 episodes of non bloody emesis. feels better after vomiting.  had improved and started getting worse last night.  He was seen by a gastroenterologist on Monday.  Gen.  no acute distress  HEENT:  perrl eomi  Lungs:  b/l bs  CVS: S1S2   Abd;  soft, +ttp llq, no guarding. no distention. no rlq tend.   Ext: no edema  Neuro: aaox 3 no focal deficits  MSK: strength 5/5 b/l upper and lower ext.

## 2019-02-27 NOTE — ED PROVIDER NOTE - GASTROINTESTINAL, MLM
Abdomen distended, non tender abdomen, hyperactive bowel sounds; no palpable defects; no organomegaly

## 2019-02-28 VITALS
RESPIRATION RATE: 18 BRPM | DIASTOLIC BLOOD PRESSURE: 76 MMHG | SYSTOLIC BLOOD PRESSURE: 118 MMHG | OXYGEN SATURATION: 98 % | HEART RATE: 79 BPM | TEMPERATURE: 99 F

## 2019-02-28 LAB
ANION GAP SERPL CALC-SCNC: 11 MMOL/L — SIGNIFICANT CHANGE UP (ref 5–17)
BUN SERPL-MCNC: 12 MG/DL — SIGNIFICANT CHANGE UP (ref 7–23)
CALCIUM SERPL-MCNC: 9.3 MG/DL — SIGNIFICANT CHANGE UP (ref 8.4–10.5)
CHLORIDE SERPL-SCNC: 104 MMOL/L — SIGNIFICANT CHANGE UP (ref 96–108)
CO2 SERPL-SCNC: 26 MMOL/L — SIGNIFICANT CHANGE UP (ref 22–31)
CREAT SERPL-MCNC: 1.38 MG/DL — HIGH (ref 0.5–1.3)
CULTURE RESULTS: SIGNIFICANT CHANGE UP
GLUCOSE SERPL-MCNC: 103 MG/DL — HIGH (ref 70–99)
POTASSIUM SERPL-MCNC: 3.7 MMOL/L — SIGNIFICANT CHANGE UP (ref 3.5–5.3)
POTASSIUM SERPL-SCNC: 3.7 MMOL/L — SIGNIFICANT CHANGE UP (ref 3.5–5.3)
SODIUM SERPL-SCNC: 141 MMOL/L — SIGNIFICANT CHANGE UP (ref 135–145)
SPECIMEN SOURCE: SIGNIFICANT CHANGE UP

## 2019-02-28 PROCEDURE — 99284 EMERGENCY DEPT VISIT MOD MDM: CPT | Mod: 25

## 2019-02-28 PROCEDURE — 83605 ASSAY OF LACTIC ACID: CPT

## 2019-02-28 PROCEDURE — 81001 URINALYSIS AUTO W/SCOPE: CPT

## 2019-02-28 PROCEDURE — 83690 ASSAY OF LIPASE: CPT

## 2019-02-28 PROCEDURE — 84295 ASSAY OF SERUM SODIUM: CPT

## 2019-02-28 PROCEDURE — 99217: CPT

## 2019-02-28 PROCEDURE — 96361 HYDRATE IV INFUSION ADD-ON: CPT

## 2019-02-28 PROCEDURE — 96365 THER/PROPH/DIAG IV INF INIT: CPT | Mod: XU

## 2019-02-28 PROCEDURE — 85027 COMPLETE CBC AUTOMATED: CPT

## 2019-02-28 PROCEDURE — 87086 URINE CULTURE/COLONY COUNT: CPT

## 2019-02-28 PROCEDURE — 84132 ASSAY OF SERUM POTASSIUM: CPT

## 2019-02-28 PROCEDURE — 80053 COMPREHEN METABOLIC PANEL: CPT

## 2019-02-28 PROCEDURE — G0378: CPT

## 2019-02-28 PROCEDURE — 74177 CT ABD & PELVIS W/CONTRAST: CPT

## 2019-02-28 PROCEDURE — 82330 ASSAY OF CALCIUM: CPT

## 2019-02-28 PROCEDURE — 96375 TX/PRO/DX INJ NEW DRUG ADDON: CPT

## 2019-02-28 PROCEDURE — 82947 ASSAY GLUCOSE BLOOD QUANT: CPT

## 2019-02-28 PROCEDURE — 80048 BASIC METABOLIC PNL TOTAL CA: CPT

## 2019-02-28 PROCEDURE — 82803 BLOOD GASES ANY COMBINATION: CPT

## 2019-02-28 PROCEDURE — 76705 ECHO EXAM OF ABDOMEN: CPT

## 2019-02-28 PROCEDURE — 82435 ASSAY OF BLOOD CHLORIDE: CPT

## 2019-02-28 PROCEDURE — 85014 HEMATOCRIT: CPT

## 2019-02-28 RX ORDER — TAMSULOSIN HYDROCHLORIDE 0.4 MG/1
1 CAPSULE ORAL
Qty: 14 | Refills: 0 | OUTPATIENT
Start: 2019-02-28 | End: 2019-03-13

## 2019-02-28 RX ORDER — ONDANSETRON 8 MG/1
1 TABLET, FILM COATED ORAL
Qty: 12 | Refills: 0 | OUTPATIENT
Start: 2019-02-28 | End: 2019-03-02

## 2019-02-28 RX ORDER — OXYCODONE HYDROCHLORIDE 5 MG/1
1 TABLET ORAL
Qty: 12 | Refills: 0 | OUTPATIENT
Start: 2019-02-28 | End: 2019-03-02

## 2019-02-28 RX ADMIN — OXYCODONE HYDROCHLORIDE 5 MILLIGRAM(S): 5 TABLET ORAL at 00:25

## 2019-02-28 NOTE — ED CDU PROVIDER SUBSEQUENT DAY NOTE - HISTORY
CDU PROGRESS NOTE JOHAN QUINN: Pt reported having increasing pain @ 00:25 and was given Oxycodone 5mg po. Pt currently resting in stretcher in NAD. VSS. BS x4 with soft, nontender abdomen. Pt without complaints. Will continue to monitor.

## 2019-02-28 NOTE — ED ADULT NURSE REASSESSMENT NOTE - NS ED NURSE REASSESS COMMENT FT1
patient is resting in the room c/o abdominal pain. waiting for ct results. md aware of pain and vs. will medicate as per md orders. will continue to monitor.
Pt received from JAYSON Fraser. Pt oriented to CDU & plan of care was discussed. Pt A&O x 4. Pt in CDU for pain control, and IVF. Pt denies any flank pain, burning on urination, frequency or urgency. Pt using strainer when going to the bathroom has not passed the stone yet. Safety & comfort measures maintained. Call bell in reach. Will continue to monitor.
07.00  am   Received the pt from JAYSON Joiner pt is observed for Renal colic  for pain management   07.30 Am  Pt reassessed Pt oriented to CDU & plan of care was discussed. Pt A&O x 4.  Pt denies any chest pain, SOB, dizziness or palpitations N/V/D fever chills  as of now . . Safety & comfort measures maintained. Call bell in reach.  IV site looks clean & dry  No signs of infiltration noted   Will continue to monitor.  11.00 Am   Pt was evaluated by Dr Glenn Shah Pt feeling better Pt is discharged ML out  JOHAN Strickland explained the follow  up care & gave the discharge Summary    Pt verbalized the understanding on follow up care  Pt has stable vitals  steady gait A&OX 4 at the time of discharge

## 2019-02-28 NOTE — ED CDU PROVIDER SUBSEQUENT DAY NOTE - PROGRESS NOTE DETAILS
CDU PROGRESS NOTE PA CHEYENNE: Pt  NAD, VSS, resting comfortably, without complaint. Patient seen at bedside in NAD.  VSS.  Patient resting comfortably without complaints. Patient reports that his pain is much better controlled.  No nausea/vomiting.  Tolerating PO.  Creatinine trending down.  -Trell Strickland PA-C

## 2019-02-28 NOTE — ED CDU PROVIDER DISPOSITION NOTE - NSFOLLOWUPINSTRUCTIONS_ED_ALL_ED_FT
1.  Stay Hydrated  2.  Start Flomax 0.4mgs at bedtime.  Take zofran 4mgs every 6 hrs as needed for nausea.  Take Tylenol 1000mgs every 6 hrs as needed for mild/moderate pain.  Take Oxycodone 5mgs every 6 hrs as needed for severe pain.  3.  Follow up with your PMD in 2-3 days       Follow up with Urology upon discharge, [phone]  4.  Return to the ER for worsening pain, persistent nausea/vomiting, fevers/chills or any other concerning symptoms

## 2019-02-28 NOTE — ED CDU PROVIDER DISPOSITION NOTE - ATTENDING CONTRIBUTION TO CARE
CDU Attending Note -- Pt seen and examined at bedside.  Case discussed c CDU PA.  Pt comfortable, asymptomatic, and has good follow up. Tolerating PO, resting comfortably, mildly elevated Cr noted; no obstruction on imaging.  Pt aware of need to f/u given Cr.  Given strict return precautions.  D/C.  --BMM

## 2019-02-28 NOTE — ED CDU PROVIDER DISPOSITION NOTE - PLAN OF CARE
STAY HYDRATED and Strain Urine. Follow up with your Primary Care Physician within the next 2-3 days as well as urology clinic [phone]. Bring a copy of your test results with you to your appointment  Continue your current medication regimen. Return to the Emergency Room if you experience new or worsening symptoms .Oxycodone 5mg every 6 hours as needed for pain. Do not drive or consume alcohol while taking. Take Flomax once daily. Take Tylenol 500mg every 6 hrs as needed for pain. Take with food 1.  Stay Hydrated  2.  Start Flomax 0.4mgs at bedtime.  Take zofran 4mgs every 6 hrs as needed for nausea.  Take Tylenol 1000mgs every 6 hrs as needed for mild/moderate pain.  Take Oxycodone 5mgs every 6 hrs as needed for severe pain.  3.  Follow up with your PMD in 2-3 days       Follow up with Urology upon discharge, [phone]  4.  Return to the ER for worsening pain, persistent nausea/vomiting, fevers/chills or any other concerning symptoms

## 2019-02-28 NOTE — ED CDU PROVIDER DISPOSITION NOTE - CLINICAL COURSE
49 yo M h/o HTN, renal stones (passed on his own) c/o 2 days of nausea, vomiting, abdominal pain. In ED, patient had laboratory significant for BUN/Creat 18/1.41 and abdominal/pelvis CT w/ contrast showing Left-sided obstructive uropathy with mild left hydroureteronephrosis to the level of a 3 mm left UVJ stone. No obstruction or acute appendicitis, as clinically questioned. Pt sent to CDU for frequent reeval, vitals q 4hrs, pain control, +/- Urology evaluation. 47 yo M h/o HTN, renal stones (passed on his own) c/o 2 days of nausea, vomiting, abdominal pain. In ED, patient had laboratory significant for BUN/Creat 18/1.41 and abdominal/pelvis CT w/ contrast showing Left-sided obstructive uropathy with mild left hydroureteronephrosis to the level of a 3 mm left UVJ stone. No obstruction or acute appendicitis, as clinically questioned. Pt sent to CDU for frequent reeval, vitals q 4hrs, pain control.  Patient responded well overnight.  Pain well controlled.  nausea/vomiting resolved.  Tolerating PO.  Patient medically stable for discharge home, to follow up with urology outpatient.

## 2019-03-04 ENCOUNTER — APPOINTMENT (OUTPATIENT)
Dept: UROLOGY | Facility: CLINIC | Age: 49
End: 2019-03-04
Payer: COMMERCIAL

## 2019-03-04 VITALS
DIASTOLIC BLOOD PRESSURE: 95 MMHG | HEART RATE: 60 BPM | WEIGHT: 175 LBS | RESPIRATION RATE: 16 BRPM | HEIGHT: 69 IN | TEMPERATURE: 98.2 F | BODY MASS INDEX: 25.92 KG/M2 | SYSTOLIC BLOOD PRESSURE: 168 MMHG

## 2019-03-04 DIAGNOSIS — N20.1 CALCULUS OF URETER: ICD-10-CM

## 2019-03-04 PROBLEM — N20.0 CALCULUS OF KIDNEY: Chronic | Status: ACTIVE | Noted: 2019-02-27

## 2019-03-04 PROCEDURE — 99204 OFFICE O/P NEW MOD 45 MIN: CPT

## 2019-03-04 RX ORDER — AMLODIPINE BESYLATE 5 MG/1
TABLET ORAL
Refills: 0 | Status: ACTIVE | COMMUNITY

## 2019-03-04 RX ORDER — TAMSULOSIN HYDROCHLORIDE 0.4 MG/1
0.4 CAPSULE ORAL
Qty: 30 | Refills: 1 | Status: ACTIVE | COMMUNITY
Start: 2019-03-04 | End: 1900-01-01

## 2019-03-04 RX ORDER — LOSARTAN POTASSIUM AND HYDROCHLOROTHIAZIDE 12.5; 5 MG/1; MG/1
50-12.5 TABLET ORAL
Refills: 0 | Status: ACTIVE | COMMUNITY

## 2019-03-04 NOTE — ASSESSMENT
[FreeTextEntry1] : L ureteral stone. \par --medical expulsive therapy with flomax\par --pain control with percocet and IBU\par --stone precautions\par --encourage fluid intake\par --renal US in 1mo to eval for passage\par --RTC in 1mo

## 2019-03-04 NOTE — REVIEW OF SYSTEMS
[Abdominal Pain] : abdominal pain [Vomiting] : vomiting [Constipation] : constipation [Heartburn] : heartburn [see HPI] : see HPI [History of kidney stones] : history of kidney stones [Strain or push to urinate] : strain or push to urinate [Negative] : Heme/Lymph [FreeTextEntry2] : htn

## 2019-03-04 NOTE — HISTORY OF PRESENT ILLNESS
[FreeTextEntry1] : 48 year old M with cc of L ureteral stone. Pt reports that he developed L sided pain acutely. He endorses nausea and emesis. He went to ER and had CT which showed 3mm stone at L UVJ. He was discharged with flomax, percocet, and zofran. Cr was 1.38 (unknown baseline). WBC was  9.1. UCx was negative. has not had any pain since leaving hospital. He has had some urinary sx with feelings of need to go without much obtained. \par \par Had one stone before 3y ago that he was able to pass on his own. No family hx of stones. \par Drinks mostly water and occasional coffee. Previously drank 2 cups of coffee. \par \par Review of CT shows 4-5mm UVJ stone. No additional stones seen.

## 2019-03-04 NOTE — PHYSICAL EXAM
[General Appearance - Well Developed] : well developed [General Appearance - Well Nourished] : well nourished [General Appearance - In No Acute Distress] : no acute distress [Edema] : no peripheral edema [Exaggerated Use Of Accessory Muscles For Inspiration] : no accessory muscle use [Abdomen Soft] : soft [Abdomen Tenderness] : non-tender [Costovertebral Angle Tenderness] : no ~M costovertebral angle tenderness [Normal Station and Gait] : the gait and station were normal for the patient's age [Skin Color & Pigmentation] : normal skin color and pigmentation [No Focal Deficits] : no focal deficits [Oriented To Time, Place, And Person] : oriented to person, place, and time [Cervical Lymph Nodes Enlarged Anterior Bilaterally] : anterior cervical [Supraclavicular Lymph Nodes Enlarged Bilaterally] : supraclavicular

## 2019-04-05 ENCOUNTER — APPOINTMENT (OUTPATIENT)
Dept: UROLOGY | Facility: CLINIC | Age: 49
End: 2019-04-05

## 2019-06-09 ENCOUNTER — APPOINTMENT (OUTPATIENT)
Dept: ORTHOPEDIC SURGERY | Facility: CLINIC | Age: 49
End: 2019-06-09
Payer: COMMERCIAL

## 2019-06-09 VITALS — DIASTOLIC BLOOD PRESSURE: 91 MMHG | HEART RATE: 68 BPM | SYSTOLIC BLOOD PRESSURE: 147 MMHG

## 2019-06-09 VITALS — BODY MASS INDEX: 25.62 KG/M2 | WEIGHT: 173 LBS | HEIGHT: 69 IN

## 2019-06-09 DIAGNOSIS — Z86.79 PERSONAL HISTORY OF OTHER DISEASES OF THE CIRCULATORY SYSTEM: ICD-10-CM

## 2019-06-09 DIAGNOSIS — M75.41 IMPINGEMENT SYNDROME OF RIGHT SHOULDER: ICD-10-CM

## 2019-06-09 DIAGNOSIS — Z85.9 PERSONAL HISTORY OF MALIGNANT NEOPLASM, UNSPECIFIED: ICD-10-CM

## 2019-06-09 PROCEDURE — 99203 OFFICE O/P NEW LOW 30 MIN: CPT

## 2019-06-09 PROCEDURE — 73030 X-RAY EXAM OF SHOULDER: CPT | Mod: RT

## 2019-06-09 NOTE — PHYSICAL EXAM
[Normal RUE] : Right Upper Extremity: No scars, rashes, lesions, ulcers, skin intact [Normal] : Alert and in no acute distress [Normal LUE] : Left Upper Extremity: No scars, rashes, lesions, ulcers, skin intact [de-identified] : Muscular individual \par Right Shoulder:\par old surgical incision posteriorly (lipoma excised)\par no deformity, ecchymosis, erythema\par no atrophy\par nontender to palpation at deltoid\par tender to palpation at proximal biceps tendon\par elevation = 160'  \par full abduction\par internal rotation = thumb to T12\par external rotation = 45'\par + Neer impingement\par + Tristan impingement\par negative drop arm\par + Speeds test\par 4+/5 external rotation\par FROM at elbow, wrist and hand with 5/5 motor\par \par Left Shoulder:\par no deformity, ecchymosis, erythema\par no atrophy\par Non tender to palpation at deltoid\par tender to palpation at proximal biceps tendon\par Elevation = 165'\par Full abduction \par internal rotation thumb to T12\par external rotation = 50'\par Negative impingement testing\par negative drop arm\par negative Speeds test\par 5/5 power\par FROM at elbow, wrist and hand with 5/5 motor\par  [de-identified] : no swelling, no edema, skin warm and well-perfused  [de-identified] : X-rays of the right shoulder reveal degenerative change with inferior osteophyte formation at the AC joint. Mild greater tuberosity change. Mild cystic change in humeral head

## 2019-06-09 NOTE — DISCUSSION/SUMMARY
[de-identified] : X-rays were reviewed with patient. Discussed the causes of impingement syndrome.  Discussed the role of HEP, PT, subacromial steroid injection.  Patient was instructed on HEP, use of Therabands, importance of ROM and risk for Adhesive Capsulitis. Discussed the role of MRI if indicated at follow up appointment.

## 2019-06-09 NOTE — HISTORY OF PRESENT ILLNESS
[de-identified] : Patient is a 48 y.o. RHD male , who presents with c/o right shoulder pain for 2 1/2 weeks/months.  Most of the pain is located at superolateal aspect. Patient describes the pain as sharp when exercising and aching at other times.  Patient reports he is unable to sleep on the right side.  The shoulder aches at night.  There is pain with certain ROM, but patient denies loss of ROM.  Patient has pain donning a jacket.  There is pain when reaching above the head, for example, to reach into a cabinet. There has been no trauma. He admits that he works out and does lifting 3-4x/week. When laying on the arm, he does find that his hand can get numb and he needs to "shake it out" and roll over.  Patient has tried ice which helps, heat doesn't help.  Patient hasn't taken anything for pain. Patient denies similar sxs in the past. Patient denies any weakness, neck pain. \par \par \par \par

## 2019-06-12 PROBLEM — M75.41 IMPINGEMENT SYNDROME OF RIGHT SHOULDER: Status: ACTIVE | Noted: 2019-06-09

## 2021-08-06 ENCOUNTER — APPOINTMENT (OUTPATIENT)
Dept: ORTHOPEDIC SURGERY | Facility: CLINIC | Age: 51
End: 2021-08-06
Payer: COMMERCIAL

## 2021-08-06 ENCOUNTER — TRANSCRIPTION ENCOUNTER (OUTPATIENT)
Age: 51
End: 2021-08-06

## 2021-08-06 VITALS
WEIGHT: 168 LBS | DIASTOLIC BLOOD PRESSURE: 91 MMHG | HEIGHT: 69 IN | SYSTOLIC BLOOD PRESSURE: 147 MMHG | BODY MASS INDEX: 24.88 KG/M2 | HEART RATE: 72 BPM

## 2021-08-06 DIAGNOSIS — M25.511 PAIN IN RIGHT SHOULDER: ICD-10-CM

## 2021-08-06 PROCEDURE — 99214 OFFICE O/P EST MOD 30 MIN: CPT

## 2021-08-06 PROCEDURE — 73030 X-RAY EXAM OF SHOULDER: CPT | Mod: RT

## 2021-08-06 NOTE — HISTORY OF PRESENT ILLNESS
[de-identified] : Patient is a 50 year-old, right-hand-dominant male who presents to the office today for initial evaluation of right shoulder pain. The pain has been going on for over 2 years. He was diagnosed with impingement of the right shoulder at that time, that improved and he felt all better. Now, he complains of anterior pain on forward flexion of the arm for 1 week. He also has pain/weakness with bringing his arm down from an overhead position. These symptoms began over the past weekend in the absence of any particular injury. He does state that he works out at the gym, but denies any specific inciting event. Pain is sharp with the aforementioned movements. He does not take any medications for his shoulder. He does use ice and heat with some improvement.

## 2021-08-06 NOTE — PHYSICAL EXAM
[Rad] : radial 2+ and symmetric bilaterally [Normal] : Alert and in no acute distress [Poor Appearance] : well-appearing [Acute Distress] : not in acute distress [Obese] : not obese [de-identified] : The patient has no respiratory distress. Mood and affect are normal. The patient is alert and oriented to person, place and time.\par Examination of the cervical spine demonstrates no tenderness, no deformity and no muscle spasm. Cervical spine rotation is 60° to the right, 60° to the left, 75° of extension and 45° of flexion. Neurologic exam of the upper extremities reveals intact sensation to light touch. Motor function is 5 over 5 in all groups. Deep tendon reflexes are 2+ and equal at the biceps, triceps and brachioradialis.\par Examination of the right shoulder demonstrates no deformity. The skin is intact. There is no erythema. There is tenderness anteriorly. Impingement sign is positive There is no instability. Drop arm test is negative. Empty can test is negative. Liftoff test is negative. Starr test is negative.  He has elevation of 130 degrees, external rotation of 45 degrees and internal rotation to the upper lumbar level.  The elbow is stable and nontender.  There is no lymphedema. [de-identified] : AP, transscapular and axillary x-rays of the right shoulder demonstrate no fracture, no dislocation and no bony abnormality.

## 2021-08-06 NOTE — DISCUSSION/SUMMARY
[de-identified] : The patient has tendinitis of the right shoulder.  He has had some improvement with rest.  I have discussed the pathology and natural history with him.  He will continue to modify his activities.  He will ice his shoulder and take over-the-counter medication.  If symptomatic in 3 weeks he will return.

## 2022-06-29 ENCOUNTER — RESULT REVIEW (OUTPATIENT)
Age: 52
End: 2022-06-29

## 2022-11-02 ENCOUNTER — APPOINTMENT (OUTPATIENT)
Dept: ORTHOPEDIC SURGERY | Facility: CLINIC | Age: 52
End: 2022-11-02

## 2022-11-02 VITALS
HEART RATE: 66 BPM | TEMPERATURE: 97.9 F | DIASTOLIC BLOOD PRESSURE: 79 MMHG | OXYGEN SATURATION: 98 % | SYSTOLIC BLOOD PRESSURE: 131 MMHG | WEIGHT: 170 LBS | BODY MASS INDEX: 25.18 KG/M2 | HEIGHT: 69 IN

## 2022-11-02 DIAGNOSIS — S39.012A STRAIN OF MUSCLE, FASCIA AND TENDON OF LOWER BACK, INITIAL ENCOUNTER: ICD-10-CM

## 2022-11-02 PROCEDURE — 99214 OFFICE O/P EST MOD 30 MIN: CPT

## 2024-09-01 NOTE — ASU PREOP CHECKLIST - HAIR REMOVAL
The following room check was performed for environmental safety of the patient.       DOORWAY        Contraband/Damage?  No        BATHROOM       Contraband/Damage? No      PAPER GARBAGE BAG     Contraband/Damage? No      BEDS            Contraband/Damage?  No      WALLS       Contraband/Damage?  No      UNDER MATTRESSES       Contraband/Damage? No      CLOTHING SHELVES     Contraband/Damage? No       DRESSERS       Contraband/Damage? No      CHAIRS       Contraband/Damage?  No      WINDOW CELL       Contraband/Damage?  No      FLOOR       Contraband/Damage?  No      If contraband found, incident report filed?   If damage found, was charge nurse and manager notified?   If damage found, incident report or work order placed?    hair removal not indicated

## 2024-09-03 NOTE — ED PROVIDER NOTE - PSYCHIATRIC, MLM
Patient previously on trazodone and is requesting a refill. Usually only takes as needed and, more often than not, will only take a half tablet (50mg).   Alert and oriented to person, place, time/situation. normal mood and affect. no apparent risk to self or others.

## 2025-02-06 NOTE — ASU PREOP CHECKLIST - NS PREOP CHK CHLOROHEX WASH
The patient's goals for the shift include      The clinical goals for the shift include pt will report decreased SOB throughout the shift    Over the shift, the patient did make progress toward the following goals.      N/A

## 2025-05-07 ENCOUNTER — NON-APPOINTMENT (OUTPATIENT)
Age: 55
End: 2025-05-07

## 2025-05-08 ENCOUNTER — LABORATORY RESULT (OUTPATIENT)
Age: 55
End: 2025-05-08

## 2025-05-08 ENCOUNTER — APPOINTMENT (OUTPATIENT)
Dept: UROLOGY | Facility: CLINIC | Age: 55
End: 2025-05-08
Payer: COMMERCIAL

## 2025-05-08 VITALS
HEART RATE: 61 BPM | DIASTOLIC BLOOD PRESSURE: 79 MMHG | OXYGEN SATURATION: 100 % | BODY MASS INDEX: 26.22 KG/M2 | RESPIRATION RATE: 16 BRPM | HEIGHT: 69 IN | WEIGHT: 177 LBS | SYSTOLIC BLOOD PRESSURE: 144 MMHG

## 2025-05-08 DIAGNOSIS — Z31.41 ENCOUNTER FOR FERTILITY TESTING: ICD-10-CM

## 2025-05-08 DIAGNOSIS — Z00.00 ENCOUNTER FOR GENERAL ADULT MEDICAL EXAMINATION W/OUT ABNORMAL FINDINGS: ICD-10-CM

## 2025-05-08 PROCEDURE — 99203 OFFICE O/P NEW LOW 30 MIN: CPT

## 2025-05-09 LAB
ALBUMIN SERPL ELPH-MCNC: 4.9 G/DL
ANION GAP SERPL CALC-SCNC: 14 MMOL/L
APPEARANCE: CLEAR
BILIRUBIN URINE: NEGATIVE
BLOOD URINE: NEGATIVE
BUN SERPL-MCNC: 17 MG/DL
CALCIUM SERPL-MCNC: 10.2 MG/DL
CHLORIDE SERPL-SCNC: 102 MMOL/L
CO2 SERPL-SCNC: 26 MMOL/L
COLOR: YELLOW
CREAT SERPL-MCNC: 1.25 MG/DL
EGFRCR SERPLBLD CKD-EPI 2021: 68 ML/MIN/1.73M2
GLUCOSE QUALITATIVE U: NEGATIVE MG/DL
GLUCOSE SERPL-MCNC: 97 MG/DL
KETONES URINE: NEGATIVE MG/DL
LEUKOCYTE ESTERASE URINE: ABNORMAL
NITRITE URINE: NEGATIVE
PH URINE: 7
PHOSPHATE SERPL-MCNC: 3.2 MG/DL
POTASSIUM SERPL-SCNC: 4.1 MMOL/L
PROTEIN URINE: NEGATIVE MG/DL
PSA SERPL-MCNC: 0.69 NG/ML
SODIUM SERPL-SCNC: 142 MMOL/L
SPECIFIC GRAVITY URINE: 1.02
UROBILINOGEN URINE: 0.2 MG/DL

## 2025-05-11 LAB — BACTERIA UR CULT: NORMAL
